# Patient Record
Sex: FEMALE | Race: OTHER | URBAN - METROPOLITAN AREA
[De-identification: names, ages, dates, MRNs, and addresses within clinical notes are randomized per-mention and may not be internally consistent; named-entity substitution may affect disease eponyms.]

---

## 2019-06-13 ENCOUNTER — INPATIENT (INPATIENT)
Facility: HOSPITAL | Age: 74
LOS: 3 days | Discharge: HOME | End: 2019-06-17
Attending: INTERNAL MEDICINE | Admitting: INTERNAL MEDICINE
Payer: MEDICARE

## 2019-06-13 VITALS
RESPIRATION RATE: 17 BRPM | DIASTOLIC BLOOD PRESSURE: 75 MMHG | SYSTOLIC BLOOD PRESSURE: 150 MMHG | HEART RATE: 89 BPM | OXYGEN SATURATION: 98 % | HEIGHT: 61 IN | TEMPERATURE: 98 F | WEIGHT: 160.06 LBS

## 2019-06-13 LAB
ALBUMIN SERPL ELPH-MCNC: 3.9 G/DL — SIGNIFICANT CHANGE UP (ref 3.3–5)
ALP SERPL-CCNC: 69 U/L — SIGNIFICANT CHANGE UP (ref 40–120)
ALT FLD-CCNC: 30 U/L — SIGNIFICANT CHANGE UP (ref 12–78)
AMMONIA BLD-MCNC: 16 UMOL/L — SIGNIFICANT CHANGE UP (ref 11–32)
ANION GAP SERPL CALC-SCNC: 8 MMOL/L — SIGNIFICANT CHANGE UP (ref 5–17)
APAP SERPL-MCNC: < 2 UG/ML (ref 10–30)
APTT BLD: 30 SEC — SIGNIFICANT CHANGE UP (ref 27.5–36.3)
AST SERPL-CCNC: 30 U/L — SIGNIFICANT CHANGE UP (ref 15–37)
BASOPHILS # BLD AUTO: 0.05 K/UL — SIGNIFICANT CHANGE UP (ref 0–0.2)
BASOPHILS NFR BLD AUTO: 0.6 % — SIGNIFICANT CHANGE UP (ref 0–2)
BILIRUB SERPL-MCNC: 0.4 MG/DL — SIGNIFICANT CHANGE UP (ref 0.2–1.2)
BUN SERPL-MCNC: 12 MG/DL — SIGNIFICANT CHANGE UP (ref 7–23)
CALCIUM SERPL-MCNC: 9 MG/DL — SIGNIFICANT CHANGE UP (ref 8.5–10.1)
CHLORIDE SERPL-SCNC: 105 MMOL/L — SIGNIFICANT CHANGE UP (ref 96–108)
CO2 SERPL-SCNC: 26 MMOL/L — SIGNIFICANT CHANGE UP (ref 22–31)
CREAT SERPL-MCNC: 1.02 MG/DL — SIGNIFICANT CHANGE UP (ref 0.5–1.3)
EOSINOPHIL # BLD AUTO: 0 K/UL — SIGNIFICANT CHANGE UP (ref 0–0.5)
EOSINOPHIL NFR BLD AUTO: 0 % — SIGNIFICANT CHANGE UP (ref 0–6)
ETHANOL SERPL-MCNC: <10 MG/DL — SIGNIFICANT CHANGE UP (ref 0–10)
GLUCOSE SERPL-MCNC: 166 MG/DL — HIGH (ref 70–99)
HCT VFR BLD CALC: 35.1 % — SIGNIFICANT CHANGE UP (ref 34.5–45)
HGB BLD-MCNC: 11.2 G/DL — LOW (ref 11.5–15.5)
IMM GRANULOCYTES NFR BLD AUTO: 0.4 % — SIGNIFICANT CHANGE UP (ref 0–1.5)
INR BLD: 1 RATIO — SIGNIFICANT CHANGE UP (ref 0.88–1.16)
LACTATE SERPL-SCNC: 1.5 MMOL/L — SIGNIFICANT CHANGE UP (ref 0.7–2)
LIDOCAIN IGE QN: 117 U/L — SIGNIFICANT CHANGE UP (ref 73–393)
LYMPHOCYTES # BLD AUTO: 0.97 K/UL — LOW (ref 1–3.3)
LYMPHOCYTES # BLD AUTO: 12.6 % — LOW (ref 13–44)
MCHC RBC-ENTMCNC: 30 PG — SIGNIFICANT CHANGE UP (ref 27–34)
MCHC RBC-ENTMCNC: 31.9 GM/DL — LOW (ref 32–36)
MCV RBC AUTO: 94.1 FL — SIGNIFICANT CHANGE UP (ref 80–100)
MONOCYTES # BLD AUTO: 0.31 K/UL — SIGNIFICANT CHANGE UP (ref 0–0.9)
MONOCYTES NFR BLD AUTO: 4 % — SIGNIFICANT CHANGE UP (ref 2–14)
NEUTROPHILS # BLD AUTO: 6.35 K/UL — SIGNIFICANT CHANGE UP (ref 1.8–7.4)
NEUTROPHILS NFR BLD AUTO: 82.4 % — HIGH (ref 43–77)
NRBC # BLD: 0 /100 WBCS — SIGNIFICANT CHANGE UP (ref 0–0)
PLATELET # BLD AUTO: 420 K/UL — HIGH (ref 150–400)
POTASSIUM SERPL-MCNC: 4 MMOL/L — SIGNIFICANT CHANGE UP (ref 3.5–5.3)
POTASSIUM SERPL-SCNC: 4 MMOL/L — SIGNIFICANT CHANGE UP (ref 3.5–5.3)
PROT SERPL-MCNC: 8 GM/DL — SIGNIFICANT CHANGE UP (ref 6–8.3)
PROTHROM AB SERPL-ACNC: 11.2 SEC — SIGNIFICANT CHANGE UP (ref 10–12.9)
RBC # BLD: 3.73 M/UL — LOW (ref 3.8–5.2)
RBC # FLD: 13.7 % — SIGNIFICANT CHANGE UP (ref 10.3–14.5)
SALICYLATES SERPL-MCNC: <1.7 MG/DL — LOW (ref 2.8–20)
SODIUM SERPL-SCNC: 139 MMOL/L — SIGNIFICANT CHANGE UP (ref 135–145)
TROPONIN I SERPL-MCNC: <.015 NG/ML — SIGNIFICANT CHANGE UP (ref 0.01–0.04)
WBC # BLD: 7.71 K/UL — SIGNIFICANT CHANGE UP (ref 3.8–10.5)
WBC # FLD AUTO: 7.71 K/UL — SIGNIFICANT CHANGE UP (ref 3.8–10.5)

## 2019-06-13 PROCEDURE — 71045 X-RAY EXAM CHEST 1 VIEW: CPT | Mod: 26

## 2019-06-13 PROCEDURE — 99285 EMERGENCY DEPT VISIT HI MDM: CPT

## 2019-06-13 RX ORDER — ONDANSETRON 8 MG/1
4 TABLET, FILM COATED ORAL ONCE
Refills: 0 | Status: COMPLETED | OUTPATIENT
Start: 2019-06-13 | End: 2019-06-13

## 2019-06-13 RX ORDER — SODIUM CHLORIDE 9 MG/ML
1000 INJECTION INTRAMUSCULAR; INTRAVENOUS; SUBCUTANEOUS ONCE
Refills: 0 | Status: COMPLETED | OUTPATIENT
Start: 2019-06-13 | End: 2019-06-13

## 2019-06-13 RX ADMIN — SODIUM CHLORIDE 1000 MILLILITER(S): 9 INJECTION INTRAMUSCULAR; INTRAVENOUS; SUBCUTANEOUS at 22:14

## 2019-06-13 RX ADMIN — ONDANSETRON 4 MILLIGRAM(S): 8 TABLET, FILM COATED ORAL at 22:13

## 2019-06-13 NOTE — ED ADULT NURSE NOTE - DOES PATIENT HAVE ADVANCE DIRECTIVE
Patient should be seen in clinic to discuss results of CT of the chest and bone scan. Please schedule him for follow up to discuss these results.    No

## 2019-06-13 NOTE — ED ADULT NURSE NOTE - NSIMPLEMENTINTERV_GEN_ALL_ED
Implemented All Fall with Harm Risk Interventions:  [place] to call system. Call bell, personal items and telephone within reach. Instruct patient to call for assistance. Room bathroom lighting operational. Non-slip footwear when patient is off stretcher. Physically safe environment: no spills, clutter or unnecessary equipment. Stretcher in lowest position, wheels locked, appropriate side rails in place. Provide visual cue, wrist band, yellow gown, etc. Monitor gait and stability. Monitor for mental status changes and reorient to person, place, and time. Review medications for side effects contributing to fall risk. Reinforce activity limits and safety measures with patient and family. Provide visual clues: red socks.

## 2019-06-13 NOTE — ED ADULT NURSE NOTE - OBJECTIVE STATEMENT
pt lethargic refuses to speak, pt [place]i speaking only. Md ibanez at bedside translation, pt refuses to speak and states " leave me alone", pt excessive sleepiness, drowsy, lethargic. PERRLA +. Pt refuses to follow commands Pt BIBA as per family pt not at baseline, confused and suspicion for overdose of medications. 1:1 initiated. PMH Hyperlipidemia, HTN, DM as per family. As per family leg pain at home took new pain medication (unknown name), began having N/V at home

## 2019-06-14 DIAGNOSIS — Z29.9 ENCOUNTER FOR PROPHYLACTIC MEASURES, UNSPECIFIED: ICD-10-CM

## 2019-06-14 DIAGNOSIS — T50.905A ADVERSE EFFECT OF UNSPECIFIED DRUGS, MEDICAMENTS AND BIOLOGICAL SUBSTANCES, INITIAL ENCOUNTER: ICD-10-CM

## 2019-06-14 DIAGNOSIS — R41.0 DISORIENTATION, UNSPECIFIED: ICD-10-CM

## 2019-06-14 DIAGNOSIS — I10 ESSENTIAL (PRIMARY) HYPERTENSION: ICD-10-CM

## 2019-06-14 DIAGNOSIS — F19.921 OTHER PSYCHOACTIVE SUBSTANCE USE, UNSPECIFIED WITH INTOXICATION WITH DELIRIUM: ICD-10-CM

## 2019-06-14 DIAGNOSIS — E11.8 TYPE 2 DIABETES MELLITUS WITH UNSPECIFIED COMPLICATIONS: ICD-10-CM

## 2019-06-14 LAB
ALBUMIN SERPL ELPH-MCNC: 3.5 G/DL — SIGNIFICANT CHANGE UP (ref 3.3–5)
ALP SERPL-CCNC: 64 U/L — SIGNIFICANT CHANGE UP (ref 40–120)
ALT FLD-CCNC: 26 U/L — SIGNIFICANT CHANGE UP (ref 12–78)
AMPHET UR-MCNC: NEGATIVE — SIGNIFICANT CHANGE UP
ANION GAP SERPL CALC-SCNC: 9 MMOL/L — SIGNIFICANT CHANGE UP (ref 5–17)
APPEARANCE UR: CLEAR — SIGNIFICANT CHANGE UP
AST SERPL-CCNC: 23 U/L — SIGNIFICANT CHANGE UP (ref 15–37)
BARBITURATES UR SCN-MCNC: NEGATIVE — SIGNIFICANT CHANGE UP
BASOPHILS # BLD AUTO: 0.04 K/UL — SIGNIFICANT CHANGE UP (ref 0–0.2)
BASOPHILS NFR BLD AUTO: 0.7 % — SIGNIFICANT CHANGE UP (ref 0–2)
BENZODIAZ UR-MCNC: NEGATIVE — SIGNIFICANT CHANGE UP
BILIRUB SERPL-MCNC: 0.4 MG/DL — SIGNIFICANT CHANGE UP (ref 0.2–1.2)
BILIRUB UR-MCNC: NEGATIVE — SIGNIFICANT CHANGE UP
BUN SERPL-MCNC: 10 MG/DL — SIGNIFICANT CHANGE UP (ref 7–23)
CALCIUM SERPL-MCNC: 8.7 MG/DL — SIGNIFICANT CHANGE UP (ref 8.5–10.1)
CHLORIDE SERPL-SCNC: 113 MMOL/L — HIGH (ref 96–108)
CO2 SERPL-SCNC: 25 MMOL/L — SIGNIFICANT CHANGE UP (ref 22–31)
COCAINE METAB.OTHER UR-MCNC: NEGATIVE — SIGNIFICANT CHANGE UP
COLOR SPEC: YELLOW — SIGNIFICANT CHANGE UP
CREAT SERPL-MCNC: 0.83 MG/DL — SIGNIFICANT CHANGE UP (ref 0.5–1.3)
DIFF PNL FLD: NEGATIVE — SIGNIFICANT CHANGE UP
EOSINOPHIL # BLD AUTO: 0.05 K/UL — SIGNIFICANT CHANGE UP (ref 0–0.5)
EOSINOPHIL NFR BLD AUTO: 0.9 % — SIGNIFICANT CHANGE UP (ref 0–6)
EPI CELLS # UR: SIGNIFICANT CHANGE UP
GLUCOSE SERPL-MCNC: 94 MG/DL — SIGNIFICANT CHANGE UP (ref 70–99)
GLUCOSE UR QL: NEGATIVE MG/DL — SIGNIFICANT CHANGE UP
HCT VFR BLD CALC: 33.2 % — LOW (ref 34.5–45)
HGB BLD-MCNC: 10.6 G/DL — LOW (ref 11.5–15.5)
IMM GRANULOCYTES NFR BLD AUTO: 0.2 % — SIGNIFICANT CHANGE UP (ref 0–1.5)
KETONES UR-MCNC: NEGATIVE — SIGNIFICANT CHANGE UP
LEUKOCYTE ESTERASE UR-ACNC: ABNORMAL
LYMPHOCYTES # BLD AUTO: 1.69 K/UL — SIGNIFICANT CHANGE UP (ref 1–3.3)
LYMPHOCYTES # BLD AUTO: 28.8 % — SIGNIFICANT CHANGE UP (ref 13–44)
MCHC RBC-ENTMCNC: 29.8 PG — SIGNIFICANT CHANGE UP (ref 27–34)
MCHC RBC-ENTMCNC: 31.9 GM/DL — LOW (ref 32–36)
MCV RBC AUTO: 93.3 FL — SIGNIFICANT CHANGE UP (ref 80–100)
METHADONE UR-MCNC: NEGATIVE — SIGNIFICANT CHANGE UP
MONOCYTES # BLD AUTO: 0.42 K/UL — SIGNIFICANT CHANGE UP (ref 0–0.9)
MONOCYTES NFR BLD AUTO: 7.2 % — SIGNIFICANT CHANGE UP (ref 2–14)
NEUTROPHILS # BLD AUTO: 3.66 K/UL — SIGNIFICANT CHANGE UP (ref 1.8–7.4)
NEUTROPHILS NFR BLD AUTO: 62.2 % — SIGNIFICANT CHANGE UP (ref 43–77)
NITRITE UR-MCNC: NEGATIVE — SIGNIFICANT CHANGE UP
NRBC # BLD: 0 /100 WBCS — SIGNIFICANT CHANGE UP (ref 0–0)
OPIATES UR-MCNC: NEGATIVE — SIGNIFICANT CHANGE UP
PCP SPEC-MCNC: SIGNIFICANT CHANGE UP
PCP UR-MCNC: NEGATIVE — SIGNIFICANT CHANGE UP
PH UR: 7 — SIGNIFICANT CHANGE UP (ref 5–8)
PLATELET # BLD AUTO: 384 K/UL — SIGNIFICANT CHANGE UP (ref 150–400)
POTASSIUM SERPL-MCNC: 3.5 MMOL/L — SIGNIFICANT CHANGE UP (ref 3.5–5.3)
POTASSIUM SERPL-SCNC: 3.5 MMOL/L — SIGNIFICANT CHANGE UP (ref 3.5–5.3)
PROT SERPL-MCNC: 7.3 GM/DL — SIGNIFICANT CHANGE UP (ref 6–8.3)
PROT UR-MCNC: NEGATIVE MG/DL — SIGNIFICANT CHANGE UP
RBC # BLD: 3.56 M/UL — LOW (ref 3.8–5.2)
RBC # FLD: 13.6 % — SIGNIFICANT CHANGE UP (ref 10.3–14.5)
SODIUM SERPL-SCNC: 147 MMOL/L — HIGH (ref 135–145)
SP GR SPEC: 1 — LOW (ref 1.01–1.02)
THC UR QL: NEGATIVE — SIGNIFICANT CHANGE UP
UROBILINOGEN FLD QL: NEGATIVE MG/DL — SIGNIFICANT CHANGE UP
WBC # BLD: 5.87 K/UL — SIGNIFICANT CHANGE UP (ref 3.8–10.5)
WBC # FLD AUTO: 5.87 K/UL — SIGNIFICANT CHANGE UP (ref 3.8–10.5)

## 2019-06-14 PROCEDURE — 93010 ELECTROCARDIOGRAM REPORT: CPT

## 2019-06-14 PROCEDURE — 74177 CT ABD & PELVIS W/CONTRAST: CPT | Mod: 26

## 2019-06-14 PROCEDURE — 90792 PSYCH DIAG EVAL W/MED SRVCS: CPT

## 2019-06-14 PROCEDURE — 99223 1ST HOSP IP/OBS HIGH 75: CPT

## 2019-06-14 PROCEDURE — 70450 CT HEAD/BRAIN W/O DYE: CPT | Mod: 26

## 2019-06-14 RX ORDER — FOLIC ACID 0.8 MG
1 TABLET ORAL DAILY
Refills: 0 | Status: DISCONTINUED | OUTPATIENT
Start: 2019-06-14 | End: 2019-06-17

## 2019-06-14 RX ORDER — ATORVASTATIN CALCIUM 80 MG/1
40 TABLET, FILM COATED ORAL AT BEDTIME
Refills: 0 | Status: DISCONTINUED | OUTPATIENT
Start: 2019-06-14 | End: 2019-06-17

## 2019-06-14 RX ORDER — HALOPERIDOL DECANOATE 100 MG/ML
2 INJECTION INTRAMUSCULAR EVERY 6 HOURS
Refills: 0 | Status: DISCONTINUED | OUTPATIENT
Start: 2019-06-14 | End: 2019-06-14

## 2019-06-14 RX ORDER — MIDAZOLAM HYDROCHLORIDE 1 MG/ML
2 INJECTION, SOLUTION INTRAMUSCULAR; INTRAVENOUS ONCE
Refills: 0 | Status: DISCONTINUED | OUTPATIENT
Start: 2019-06-14 | End: 2019-06-14

## 2019-06-14 RX ORDER — ONDANSETRON 8 MG/1
4 TABLET, FILM COATED ORAL EVERY 6 HOURS
Refills: 0 | Status: DISCONTINUED | OUTPATIENT
Start: 2019-06-14 | End: 2019-06-17

## 2019-06-14 RX ORDER — HALOPERIDOL DECANOATE 100 MG/ML
3 INJECTION INTRAMUSCULAR EVERY 6 HOURS
Refills: 0 | Status: DISCONTINUED | OUTPATIENT
Start: 2019-06-14 | End: 2019-06-17

## 2019-06-14 RX ORDER — HALOPERIDOL DECANOATE 100 MG/ML
2 INJECTION INTRAMUSCULAR EVERY 6 HOURS
Refills: 0 | Status: DISCONTINUED | OUTPATIENT
Start: 2019-06-14 | End: 2019-06-17

## 2019-06-14 RX ORDER — NALOXONE HYDROCHLORIDE 4 MG/.1ML
0.4 SPRAY NASAL ONCE
Refills: 0 | Status: COMPLETED | OUTPATIENT
Start: 2019-06-14 | End: 2019-06-14

## 2019-06-14 RX ORDER — MIRTAZAPINE 45 MG/1
15 TABLET, ORALLY DISINTEGRATING ORAL AT BEDTIME
Refills: 0 | Status: DISCONTINUED | OUTPATIENT
Start: 2019-06-14 | End: 2019-06-17

## 2019-06-14 RX ORDER — BACLOFEN 100 %
20 POWDER (GRAM) MISCELLANEOUS
Qty: 0 | Refills: 0 | DISCHARGE

## 2019-06-14 RX ORDER — HEPARIN SODIUM 5000 [USP'U]/ML
5000 INJECTION INTRAVENOUS; SUBCUTANEOUS EVERY 8 HOURS
Refills: 0 | Status: DISCONTINUED | OUTPATIENT
Start: 2019-06-14 | End: 2019-06-17

## 2019-06-14 RX ADMIN — ONDANSETRON 4 MILLIGRAM(S): 8 TABLET, FILM COATED ORAL at 22:33

## 2019-06-14 RX ADMIN — MIDAZOLAM HYDROCHLORIDE 2 MILLIGRAM(S): 1 INJECTION, SOLUTION INTRAMUSCULAR; INTRAVENOUS at 01:05

## 2019-06-14 RX ADMIN — NALOXONE HYDROCHLORIDE 0.4 MILLIGRAM(S): 4 SPRAY NASAL at 02:55

## 2019-06-14 RX ADMIN — ONDANSETRON 4 MILLIGRAM(S): 8 TABLET, FILM COATED ORAL at 14:12

## 2019-06-14 RX ADMIN — HEPARIN SODIUM 5000 UNIT(S): 5000 INJECTION INTRAVENOUS; SUBCUTANEOUS at 22:34

## 2019-06-14 RX ADMIN — MIDAZOLAM HYDROCHLORIDE 2 MILLIGRAM(S): 1 INJECTION, SOLUTION INTRAMUSCULAR; INTRAVENOUS at 00:31

## 2019-06-14 RX ADMIN — Medication 1 MILLIGRAM(S): at 13:37

## 2019-06-14 RX ADMIN — HEPARIN SODIUM 5000 UNIT(S): 5000 INJECTION INTRAVENOUS; SUBCUTANEOUS at 13:37

## 2019-06-14 RX ADMIN — ATORVASTATIN CALCIUM 40 MILLIGRAM(S): 80 TABLET, FILM COATED ORAL at 22:34

## 2019-06-14 NOTE — ED PROVIDER NOTE - CHIEF COMPLAINT
Patient to be transferred to [facility].  Is being transferred due to 
Insurance request.  Receiving facility has accepting physician and available 
space. ER physician has signed transfer form.  Patient or responsible party has 
agreed to transfer and signed form.  Patient belongings inventoried and will be 
sent with wife.  Copy of nursing notes, lab reports, EKG, Physicians Orders and 
X-rays to be sent with patient.  Report called to LLOYD Reyes at receiving 
facility.  [facility] ambulance service has been called for transfer.  ETA is 
now. The patient is a 74y Female complaining of altered mental status.

## 2019-06-14 NOTE — CHART NOTE - NSCHARTNOTEFT_GEN_A_CORE
H&P dated today reviewed in full. H&P dated today reviewed in full.  CT head and abdomen all normal. Lactate normal 1.5. Urine tox negative. Has history of Bipolar disorder. Stopped taking meds past few months.  Will ask psychiatry to reeval starting antipsychotic meds.     [employer] cell phone number is [phone].

## 2019-06-14 NOTE — H&P ADULT - HISTORY OF PRESENT ILLNESS
Pt admitted for possible overdose, in progress. 74 year old woman with PMH HTN, HLD, DM2, bipolar presents to ED after being unresponsive at home.  The majority of history was obtained from ED attending and chart as family not available.  Per ED attending, patient is from  and has been living here for months.  Patient was not feeling well 2 days ago and was given a medication by niece (the medication is not known).  After taking the medication the patient became very confused and lethargic and could not be aroused.  With supportive care at home, patient was back at baseline.  It is unclear how patient then got more of the medication (family thinks she took it on her own) but she was acting exactly the same last night so she was brought to our ED.  No one witnessed her taking more of the medication.  Patient is confused and not providing much history when seen.    In the ED, work up essentially normal.  Per ED attending, pt's pupils were pinpoint so Narcan was given and patient suddenly became much more alert and responsive.  Per poison control, given history the patient possibly ingested Suboxone or Ultram.  Family not available at this time.  Niece (not the niece who gave medication in question: Liliya [phone].

## 2019-06-14 NOTE — H&P ADULT - NSHPLABSRESULTS_GEN_ALL_CORE
Vital Signs Last 24 Hrs  T(C): 36.3 (2019 07:04), Max: 37.1 (2019 23:05)  T(F): 97.4 (2019 07:04), Max: 98.7 (2019 23:05)  HR: 85 (2019 07:04) (65 - 89)  BP: 153/77 (2019 07:04) (122/60 - 153/77)  BP(mean): --  RR: 19 (2019 07:04) (12 - 19)  SpO2: 99% (2019 07:04) (94% - 100%)          LABS:                        11.2   7.71  )-----------( 420      ( 2019 21:57 )             35.1     06-13    139  |  105  |  12  ----------------------------<  166<H>  4.0   |  26  |  1.02    Ca    9.0      2019 21:57    TPro  8.0  /  Alb  3.9  /  TBili  0.4  /  DBili  x   /  AST  30  /  ALT  30  /  AlkPhos  69  06-13    PT/INR - ( 2019 21:57 )   PT: 11.2 sec;   INR: 1.00 ratio         PTT - ( 2019 21:57 )  PTT:30.0 sec  Urinalysis Basic - ( 2019 01:40 )    Color: Yellow / Appearance: Clear / S.005 / pH: x  Gluc: x / Ketone: Negative  / Bili: Negative / Urobili: Negative mg/dL   Blood: x / Protein: Negative mg/dL / Nitrite: Negative   Leuk Esterase: Trace / RBC: x / WBC x   Sq Epi: x / Non Sq Epi: Few / Bacteria: x        RADIOLOGY & ADDITIONAL STUDIES:    CT head:  IMPRESSION:  No acute intracranial hemorrhage or mass effect from vasogenic edema.

## 2019-06-14 NOTE — H&P ADULT - ASSESSMENT
74 year old woman with PMH HTN, HLD, DM2, bipolar presents to ED after being unresponsive at home.  Patient will require admission for at least 2 midnights as detailed below:    IMPROVE VTE Individual Risk Assessment          RISK                                                          Points    [  ] Previous VTE                                                3    [  ] Thrombophilia                                             2    [  ] Lower limb paralysis                                    2        (unable to hold up >15 seconds)      [  ] Current Cancer                                             2         (within 6 months)    [x  ] Immobilization > 24 hrs                              1    [  ] ICU/CCU stay > 24 hours                            1    [ x ] Age > 60                                                    1    IMPROVE VTE Score _2________

## 2019-06-14 NOTE — BEHAVIORAL HEALTH ASSESSMENT NOTE - NSBHCHARTREVIEWIMAGING_PSY_A_CORE FT
CT head Slight asymmetric left frontal scalp swelling; please correlate clinically for history of recent trauma.

## 2019-06-14 NOTE — PATIENT PROFILE ADULT - PRIMARY SOURCE OF SUPPORT/COMFORT
My note from February 11 clearly documents that it has been cosigned    I will order the hepatitis screen now child(crystal)

## 2019-06-14 NOTE — BEHAVIORAL HEALTH ASSESSMENT NOTE - HPI (INCLUDE ILLNESS QUALITY, SEVERITY, DURATION, TIMING, CONTEXT, MODIFYING FACTORS, ASSOCIATED SIGNS AND SYMPTOMS)
75 yo female, , lives in [place], [place] with family (, adult son and his wife), here in [place] for vacation and staying with family, with hx of prior inpatient psychiatric admission x 6 months, with no hx of suicide attempts/aggression/violence/substance use/legal issues.     baclofen, mirzepri for urine, Flexeril.     ISTOP Reference #: [id number]; no record of Patient    COLLATERAL FROM NIECE Liliya [phone]: Patient lives in [place], [place], lives with her , son and daughter-in-law. Patient has been here in [place] for a few months on vacation and arrived March and has been staying with family. Patient was on psychiatric medications in [place], multiple agents for depression / mood and other diagnosis niece can't recall. Patient was very sedated, even stumbled when walking, and slept a lot when she came and seemed overmedicated. Patient stopped her medications and did well - active with family, socializing, and acting normal. Patient was c/o general chronic pain (back? c/o difficulty walking). Another cousin gave Patient her own pain medications. Patient reported feeling dizzy, shaking and having diarrhea. Liliya brought Patient to her house and Patient was sitting on the toilet for a long time, did not seem to understand what people were saying for her, taking off her clothing.    Patient's medication bottles: buspirone 7mg PO qd; sertraline 150mg po qd (1.5 tabs of 100mg); aripriprazole 5mg po qd; Remeron 30mg po qhs. These medication were NOT in Pt's possession and Niece was holding them.  - Liliya does not think 73 yo  female, , lives in [place], [place] with family (, adult son and his wife), described as a "calm person," here in [place] for vacation and staying with family (has no return ticket yet), years of insomnia (hx of sleep aids), who has no formal psychiatric history prior to 6 months ago when she was admitted to inpatient psychiatric admission in [place], given a range of diagnosis such as Bipolar Disorder and depression, with no hx of suicide attempts/aggression/violence/substance use/legal issues, who was admitted with AMS.     ISTOP Reference #: [id number]; no record of Patient. CVM - no record of Patient     COLLATERAL FROM TOÑA Lovell [phone]: Patient lives in [place], [place], lives with her , son and daughter-in-law. Patient has been here in [place] for a few months on vacation and arrived March and has been staying with family. Patient was on psychiatric medications in [place], multiple agents for depression / mood and other diagnosis niece can't recall. Patient was very sedated, even stumbled when walking, and slept a lot when she came and seemed overmedicated. Patient stopped her medications and did well - active with family, socializing, and acting normal. Patient was c/o general chronic pain (back? c/o difficulty walking). Another cousin gave Patient her own pain medications. Patient reported feeling dizzy, shaking and having diarrhea. Liliya brought Patient to her house and Patient was sitting on the toilet for a long time, did not seem to understand what people were saying for her, taking off her clothing.    Patient's medication bottles: buspirone 7mg PO qd; sertraline 150mg po qd (1.5 tabs of 100mg); aripriprazole 5mg po qd; Remeron 30mg po qhs. These medication were NOT in Pt's possession and Niece was holding them. Patient also takes baclofen, Myrbetriq (mirabegron) for bladder spasms and Flexeril.   - Liliya does not think that this was a suicide attempt/ intentional overdose. 75 yo  female, , lives in [place], [place] with family (, adult son and his wife), described as a "calm person," here in [place] for [place] and staying with family (has no return ticket yet), years of insomnia (hx of sleep aids), who has no formal psychiatric history prior to 6 months ago when she was admitted to inpatient psychiatric admission in [place], given a range of diagnosis such as Bipolar Disorder and depression, with no hx of suicide attempts/aggression/violence/substance use/legal issues, who was admitted with AMS.     Patient is overall a semi-reliable historian at this time - Patient is calm, cooperative and friendly. Reports that she speaks basic English but is having a hard time recalling English words during conversation so  services was offered, accepted ( # [id number] for English used). Patient reports that she is here visiting her family, will return to [place] in 2 weeks, has adult kids and 5 grandkids, she is retired and likes to go to movies and read. Patient reports that she took "one pill" of a medication her other niece gave her for leg pain and she took the rest of her medications as well. Patient's recall is fuzzy and she cannot name her medications or even how many tabs she takes in am/pm etc. Patient admits that she has been taking medications given to her by others but cannot recall the names other than "they were for pain." Denies that this admission involved an intentional overdose or any suicidality. Patient was asked about her psychiatric admission / seeing a psychiatrist and she was not sure. At this point,  reported to Writer that Patient is getting confused and mixing herself up with the niece. Patient denies ever having any suicidal thoughts/attempts and reports her family and deep belief in God as protective factors. She reports feeling safe on the Unit at this time and denies any perceptual distortions. Patient is not oriented to time. + difficulty maintaining attention; at times holding her head and rubbing her temples and saying "just a minute. trying to think of that word." patient is not manifesting any psychosis or manix sxs at this time. + delirium / residual confusion     ISTOP Reference #: [id number]; no record of Patient. CVM - no record of Patient     COLLATERAL FROM NIECE Liliya [phone]: Patient lives in [place], [place], lives with her , son and daughter-in-law. Patient has been here in [place] for a few months on vacation and arrived March and has been staying with family. Patient was on psychiatric medications in [place], multiple agents for depression / mood and other diagnosis niece can't recall. Patient was very sedated, even stumbled when walking, and slept a lot when she came and seemed overmedicated. Patient stopped her medications and did well - active with family, socializing, and acting normal. Patient was c/o general chronic pain (back? c/o difficulty walking). Another cousin gave Patient her own pain medications. Patient reported feeling dizzy, shaking and having diarrhea. Liliya brought Patient to her house and Patient was sitting on the toilet for a long time, did not seem to understand what people were saying for her, taking off her clothing.    Patient's medication bottles: buspirone 7mg PO qd; sertraline 150mg po qd (1.5 tabs of 100mg); aripriprazole 5mg po qd; Remeron 30mg po qhs. These medication were NOT in Pt's possession and Niece was holding them. Patient also takes baclofen, Myrbetriq (mirabegron) for bladder spasms and Flexeril.   - Liliya does not think that this was a suicide attempt/ intentional overdose.

## 2019-06-14 NOTE — BEHAVIORAL HEALTH ASSESSMENT NOTE - OTHER
low end of fair adequate deferred at this time occasional latency as "trying to think of that word" "ok" looks confused at times but euthymic overall hx of insomnia x years but has been treated with medications trying other people's medications reactive linear with episodes of confusion and disorientation does not seem to be responding to internal stimuli MMSE once confusion clears limited at this time due to confusion

## 2019-06-14 NOTE — ED PROVIDER NOTE - OBJECTIVE STATEMENT
Pertinent PMH/PSH/FHx/SHx and Review of Systems contained within:  73 yo f with pmh of BPD not currently on meds, htn, hld and dm BIBEMS for ams tonight. As per niece, patient c/o pain 2 days ago for which another family member gave her a medication that made her act agitated and altered as well as making her vomit. After sleeping overnight patient returned to baseline state of health until tonight when she suddenly had same symptoms of vomiting and acting agitated and altered. Niece believes patient may have taken same medicine again but does not know what it is.

## 2019-06-14 NOTE — BEHAVIORAL HEALTH ASSESSMENT NOTE - SUMMARY
- Patient seemingly was overmedicated w/ heavy sedation on arrival from [place]; did better after her psychiatric medications were stopped  - her 4-agent [place] regimen is seemingly excessive for someone in her age group with no significant past psych history apart from a recent admission. Would benefit from simplification overall.   - suspecting Patient accidentally overdosed on muscle relaxers/pain medication which added with an anticholinergic medication like Myrbetriq, can result in confusion, disorganized behavior and AMS.   - let Pt's system clear out so hold any unnecessary medications at this time. Hence not ordering any standing psychiatric medications at this time - Patient seemingly was overmedicated w/ heavy sedation on arrival from [place]; did better after her psychiatric medications were stopped  - her 4-agent [place] regimen is seemingly excessive for someone in her age group with no significant past psych history apart from a recent admission. Would benefit from simplification overall.   - suspecting Patient accidentally overdosed on muscle relaxers/pain medication which added with an anticholinergic medication like Myrbetriq, can result in confusion, disorganized behavior and AMS. (family confirms that Patient has been using pain medications obtained from relatives and did not tolerate it well).   - let Pt's system clear out so hold any unnecessary medications at this time. Hence not ordering any standing psychiatric medications at this time Presentation consistent with delirium:   - continue CO 1:1 as Patient is still confused  - Patient cannot leave AMA at this time   - suspecting Patient accidentally overdosed on RX medication combo (ie. muscle relaxers/pain medication which added with an anticholinergic medication like Myrbetriq, can result in confusion, disorganized behavior and AMS = (family confirms that Patient has been using pain medications obtained from relatives and did not tolerate it well).   - let Pt's system clear out so hold any unnecessary medications at this time. Hence not ordering any standing psychiatric medications at this time  - Patient seemingly was overmedicated w/ heavy sedation on arrival from [place]; did better after her psychiatric medications were stopped  - her 4-agent [place] regimen is seemingly excessive for someone in her age group with no significant past psych history apart from a recent admission. Would benefit from simplification overall.

## 2019-06-14 NOTE — H&P ADULT - NSHPPHYSICALEXAM_GEN_ALL_CORE
GENERAL: NAD, well-groomed, well-developed  HEAD:  Atraumatic, Normocephalic  EYES: PERRLA, conjunctiva and sclera clear  ENMT: No tonsillar erythema, exudates, or enlargement; Moist mucous membranes, No lesions  NECK: Supple, No JVD, Normal thyroid  NERVOUS SYSTEM:  Alert & Oriented X1, poor concentration, cannot participate in full exam  CHEST/LUNG: Clear to ascultation bilaterally; No rales, rhonchi, wheezing, or rubs  HEART: Regular rate and rhythm; No murmurs, rubs, or gallops  ABDOMEN: Soft, Nontender, Nondistended; Bowel sounds present  EXTREMITIES: no clubbing, cyanosis, or edema  SKIN: no rashes or lesions  PSYCH: flat affect

## 2019-06-14 NOTE — BEHAVIORAL HEALTH ASSESSMENT NOTE - NSBHMEDSOTHERFT_PSY_A_CORE
buspirone 7mg PO qd; sertraline 150mg po qd (1.5 tabs of 100mg); aripriprazole 5mg po qd; Remeron 30mg po qhs.

## 2019-06-15 LAB
CULTURE RESULTS: SIGNIFICANT CHANGE UP
HCV AB S/CO SERPL IA: 0.13 S/CO — SIGNIFICANT CHANGE UP (ref 0–0.99)
HCV AB SERPL-IMP: SIGNIFICANT CHANGE UP
SPECIMEN SOURCE: SIGNIFICANT CHANGE UP

## 2019-06-15 PROCEDURE — 99233 SBSQ HOSP IP/OBS HIGH 50: CPT

## 2019-06-15 RX ORDER — GLUCAGON INJECTION, SOLUTION 0.5 MG/.1ML
1 INJECTION, SOLUTION SUBCUTANEOUS ONCE
Refills: 0 | Status: DISCONTINUED | OUTPATIENT
Start: 2019-06-15 | End: 2019-06-17

## 2019-06-15 RX ORDER — ACETAMINOPHEN 500 MG
650 TABLET ORAL EVERY 6 HOURS
Refills: 0 | Status: DISCONTINUED | OUTPATIENT
Start: 2019-06-15 | End: 2019-06-17

## 2019-06-15 RX ORDER — AMLODIPINE BESYLATE 2.5 MG/1
5 TABLET ORAL DAILY
Refills: 0 | Status: DISCONTINUED | OUTPATIENT
Start: 2019-06-15 | End: 2019-06-17

## 2019-06-15 RX ORDER — INSULIN LISPRO 100/ML
VIAL (ML) SUBCUTANEOUS
Refills: 0 | Status: DISCONTINUED | OUTPATIENT
Start: 2019-06-15 | End: 2019-06-17

## 2019-06-15 RX ORDER — DEXTROSE 50 % IN WATER 50 %
12.5 SYRINGE (ML) INTRAVENOUS ONCE
Refills: 0 | Status: DISCONTINUED | OUTPATIENT
Start: 2019-06-15 | End: 2019-06-17

## 2019-06-15 RX ORDER — DEXTROSE 50 % IN WATER 50 %
25 SYRINGE (ML) INTRAVENOUS ONCE
Refills: 0 | Status: DISCONTINUED | OUTPATIENT
Start: 2019-06-15 | End: 2019-06-17

## 2019-06-15 RX ORDER — METOPROLOL TARTRATE 50 MG
25 TABLET ORAL
Refills: 0 | Status: DISCONTINUED | OUTPATIENT
Start: 2019-06-15 | End: 2019-06-15

## 2019-06-15 RX ORDER — SODIUM CHLORIDE 9 MG/ML
1000 INJECTION, SOLUTION INTRAVENOUS
Refills: 0 | Status: DISCONTINUED | OUTPATIENT
Start: 2019-06-15 | End: 2019-06-17

## 2019-06-15 RX ORDER — DEXTROSE 50 % IN WATER 50 %
15 SYRINGE (ML) INTRAVENOUS ONCE
Refills: 0 | Status: DISCONTINUED | OUTPATIENT
Start: 2019-06-15 | End: 2019-06-17

## 2019-06-15 RX ORDER — METOPROLOL TARTRATE 50 MG
25 TABLET ORAL
Refills: 0 | Status: DISCONTINUED | OUTPATIENT
Start: 2019-06-15 | End: 2019-06-17

## 2019-06-15 RX ADMIN — MIRTAZAPINE 15 MILLIGRAM(S): 45 TABLET, ORALLY DISINTEGRATING ORAL at 22:35

## 2019-06-15 RX ADMIN — HEPARIN SODIUM 5000 UNIT(S): 5000 INJECTION INTRAVENOUS; SUBCUTANEOUS at 14:32

## 2019-06-15 RX ADMIN — Medication 650 MILLIGRAM(S): at 20:33

## 2019-06-15 RX ADMIN — AMLODIPINE BESYLATE 5 MILLIGRAM(S): 2.5 TABLET ORAL at 06:22

## 2019-06-15 RX ADMIN — HEPARIN SODIUM 5000 UNIT(S): 5000 INJECTION INTRAVENOUS; SUBCUTANEOUS at 05:55

## 2019-06-15 RX ADMIN — Medication 650 MILLIGRAM(S): at 06:22

## 2019-06-15 RX ADMIN — Medication 1 MILLIGRAM(S): at 11:42

## 2019-06-15 RX ADMIN — ATORVASTATIN CALCIUM 40 MILLIGRAM(S): 80 TABLET, FILM COATED ORAL at 22:14

## 2019-06-15 RX ADMIN — Medication 650 MILLIGRAM(S): at 19:33

## 2019-06-15 RX ADMIN — Medication 25 MILLIGRAM(S): at 18:00

## 2019-06-15 NOTE — PROGRESS NOTE BEHAVIORAL HEALTH - OTHER
does not seem to be responding to internal stimuli MMSE once confusion clears limited at this time due to confusion low end of fair adequate deferred at this time occasional latency as "trying to think of that word" "ok" looks confused at times but euthymic overall reactive linear with episodes of confusion and disorientation occasional latency as "trying to think of that word" not noted today looks less confused the  yesterday; euthymic overall linear with episodes of confusion and disorientation but has shown improvement since yesterday improved since yesterday low end of fair at this time due to confusion

## 2019-06-15 NOTE — PROGRESS NOTE BEHAVIORAL HEALTH - SUMMARY
Presentation consistent with delirium:   -  PT cleared Patient, she has been calm, cooperative - try enhanced supervision  - Patient still cannot leave AMA at this time   - suspecting Patient accidentally overdosed on RX medication combo (ie. muscle relaxers/pain medication which added with an anticholinergic medication like Myrbetriq, can result in confusion, disorganized behavior and AMS = (family confirms that Patient has been using pain medications obtained from relatives and did not tolerate it well).   - let Pt's system clear out so hold any unnecessary medications at this time. Hence not ordering any standing psychiatric medications at this time  - Patient seemingly was overmedicated w/ heavy sedation on arrival from [place]; did better after her psychiatric medications were stopped  - her 4-agent [place] regimen is seemingly excessive for someone in her age group with no significant past psych history apart from a recent admission. Would benefit from simplification overall.

## 2019-06-15 NOTE — PHYSICAL THERAPY INITIAL EVALUATION ADULT - ADDITIONAL COMMENTS
Pt resides in [place] and has 4 steps in home environment c R railing up/L railing down. Pt uses a rollator for ambulation and was independent in ADL's and mobility.

## 2019-06-16 LAB
ANION GAP SERPL CALC-SCNC: 4 MMOL/L — LOW (ref 5–17)
BUN SERPL-MCNC: 13 MG/DL — SIGNIFICANT CHANGE UP (ref 7–23)
CALCIUM SERPL-MCNC: 8.6 MG/DL — SIGNIFICANT CHANGE UP (ref 8.5–10.1)
CHLORIDE SERPL-SCNC: 108 MMOL/L — SIGNIFICANT CHANGE UP (ref 96–108)
CO2 SERPL-SCNC: 30 MMOL/L — SIGNIFICANT CHANGE UP (ref 22–31)
CREAT SERPL-MCNC: 0.95 MG/DL — SIGNIFICANT CHANGE UP (ref 0.5–1.3)
GLUCOSE SERPL-MCNC: 85 MG/DL — SIGNIFICANT CHANGE UP (ref 70–99)
HBA1C BLD-MCNC: 6 % — HIGH (ref 4–5.6)
HCT VFR BLD CALC: 33.6 % — LOW (ref 34.5–45)
HGB BLD-MCNC: 10.7 G/DL — LOW (ref 11.5–15.5)
MCHC RBC-ENTMCNC: 30.1 PG — SIGNIFICANT CHANGE UP (ref 27–34)
MCHC RBC-ENTMCNC: 31.8 GM/DL — LOW (ref 32–36)
MCV RBC AUTO: 94.4 FL — SIGNIFICANT CHANGE UP (ref 80–100)
NRBC # BLD: 0 /100 WBCS — SIGNIFICANT CHANGE UP (ref 0–0)
PLATELET # BLD AUTO: 411 K/UL — HIGH (ref 150–400)
POTASSIUM SERPL-MCNC: 3.5 MMOL/L — SIGNIFICANT CHANGE UP (ref 3.5–5.3)
POTASSIUM SERPL-SCNC: 3.5 MMOL/L — SIGNIFICANT CHANGE UP (ref 3.5–5.3)
RBC # BLD: 3.56 M/UL — LOW (ref 3.8–5.2)
RBC # FLD: 13.6 % — SIGNIFICANT CHANGE UP (ref 10.3–14.5)
SODIUM SERPL-SCNC: 142 MMOL/L — SIGNIFICANT CHANGE UP (ref 135–145)
WBC # BLD: 4.84 K/UL — SIGNIFICANT CHANGE UP (ref 3.8–10.5)
WBC # FLD AUTO: 4.84 K/UL — SIGNIFICANT CHANGE UP (ref 3.8–10.5)

## 2019-06-16 PROCEDURE — 99232 SBSQ HOSP IP/OBS MODERATE 35: CPT

## 2019-06-16 RX ORDER — POTASSIUM CHLORIDE 20 MEQ
40 PACKET (EA) ORAL ONCE
Refills: 0 | Status: COMPLETED | OUTPATIENT
Start: 2019-06-16 | End: 2019-06-16

## 2019-06-16 RX ADMIN — Medication 40 MILLIEQUIVALENT(S): at 10:10

## 2019-06-16 RX ADMIN — AMLODIPINE BESYLATE 5 MILLIGRAM(S): 2.5 TABLET ORAL at 06:17

## 2019-06-16 RX ADMIN — Medication 25 MILLIGRAM(S): at 17:48

## 2019-06-16 RX ADMIN — Medication 25 MILLIGRAM(S): at 06:17

## 2019-06-16 RX ADMIN — Medication 1 MILLIGRAM(S): at 11:22

## 2019-06-16 RX ADMIN — HEPARIN SODIUM 5000 UNIT(S): 5000 INJECTION INTRAVENOUS; SUBCUTANEOUS at 14:11

## 2019-06-16 NOTE — PROGRESS NOTE BEHAVIORAL HEALTH - SUMMARY
75 yo  female, , lives in [place], [place] with family (, adult son and his wife), described as a "calm person," here in [place] for vacation and staying with family (has no return ticket yet), years of insomnia (hx of sleep aids), who has no formal psychiatric history prior to 6 months ago when she was admitted to inpatient psychiatric admission in [place], given a range of diagnosis such as Bipolar Disorder and depression, with no hx of suicide attempts/aggression/violence/substance use/legal issues, who was admitted with AMS 2/2 medication interaction    Presentation consistent with delirium:   - PT cleared Patient, she has been calm, cooperative - try enhanced supervision. Has not been tried. Will order it now  - Patient still cannot leave AMA at this time   - suspecting Patient accidentally overdosed on RX medication combo (ie. muscle relaxers/pain medication which added with an anticholinergic medication like Myrbetriq, can result in confusion, disorganized behavior and AMS = (family confirms that Patient has been using pain medications obtained from relatives and did not tolerate it well).   - let Pt's system clear out so hold any unnecessary medications at this time. Hence not ordering any standing psychiatric medications at this time  - Patient seemingly was overmedicated w/ heavy sedation on arrival from [place]; did better after her psychiatric medications were stopped  - her 4-agent [place] regimen is seemingly excessive for someone in her age group with no significant past psych history apart from a recent admission. Has benefited from simplification overall. 73 yo  female, , lives in [place], [place] with family (, adult son and his wife), described as a "calm person," here in [place] for vacation and staying with family (has no return ticket yet), years of insomnia (hx of sleep aids), who has no formal psychiatric history prior to 6 months ago when she was admitted to inpatient psychiatric admission in [place], given a range of diagnosis such as Bipolar Disorder and depression, with no hx of suicide attempts/aggression/violence/substance use/legal issues, who was admitted with AMS 2/2 medication interaction    Presentation consistent with delirium:  - suspecting Patient accidentally overdosed on RX medication combo (ie. muscle relaxers/pain medication which added with an anticholinergic medication like Myrbetriq, can result in confusion, disorganized behavior and AMS = (family confirms that Patient has been using pain medications obtained from relatives and did not tolerate it well).    - PT cleared Patient, she has been calm, cooperative - try enhanced supervision. Has not been tried. Will order it now  - pt with improved mental status today, cleared by psych for discharge. Family was informed, intermittent confusion and disorientation can last for weeks  - risk of painkillers and taking meds simultanously d/w niece who translated to pt  - her 4-agent [place] regimen is seemingly excessive for someone in her age group with no significant past psych history apart from a recent admission. Has benefited from simplification overall.

## 2019-06-16 NOTE — PROGRESS NOTE BEHAVIORAL HEALTH - NSBHFUPINTERVALHXFT_PSY_A_CORE
Pt interviewed with  [id number].  She denies acute issues. Patient slept well, mood she says is "not good..good" clarifies as "good" denies / does not exhibit any symptoms of psychosis/MDD/CHRISTIANO/eduardo; denies ay suicidal/homicidal ideation, intent or plan. Same c/o of chronic knee pain. Able to say she is in the hospital however says it's 7/21/18. She says she had to come here because "I was having a shock due to a pill"  Niece by bedside reports pt reports her room was changed yesterday due to the room being "dirty" that she is unsure what to make of. She wants to take her home and fly her to [place] by herself [with transportation, so pt would be wheelchaired to and from [place]] as soon as possible so that she can resume her care with her regular doctors. Family has no acute concerns for pt.  As per RNs one episode of agitation yesterday of hitting herself [pt stated she can't remember doing that], no details known. the night was uneventful. No PRN use. Pt interviewed with  #[id number].  She denies acute issues. Patient slept well, mood she says is "not good..good" clarifies as "good" denies / does not exhibit any symptoms of psychosis/MDD/CHRISTIANO/eduardo; denies any suicidal/homicidal ideation, intent or plan. Same c/o of chronic knee pain. Able to say she is in the hospital however says it's 7/21/18. She says she had to come here because "I was having a shock due to a pill"  Niece by bedside reports pt reports her room was changed yesterday due to the room being "dirty" that she is unsure what to make of. She wants to take her home and fly her to [place] by herself [with transportation, so pt would be wheelchaired to and from [place]] as soon as possible so that she can resume her care with her regular doctors. Family has no acute concerns for pt.  As per RNs one episode of agitation yesterday of hitting herself [pt stated she can't remember doing that], no details known. the night was uneventful. No PRN use.

## 2019-06-16 NOTE — PROGRESS NOTE ADULT - SUBJECTIVE AND OBJECTIVE BOX
Patient is a 74y old  Female who presents with a chief complaint of unresponsiveness.    INTERVAL HPI/OVERNIGHT EVENTS:  Awake and alert but remains confused - not sure where she is    MEDICATIONS  (STANDING):  amLODIPine   Tablet 5 milliGRAM(s) Oral daily  atorvastatin 40 milliGRAM(s) Oral at bedtime  folic acid 1 milliGRAM(s) Oral daily  heparin  Injectable 5000 Unit(s) SubCutaneous every 8 hours    MEDICATIONS  (PRN):  acetaminophen   Tablet .. 650 milliGRAM(s) Oral every 6 hours PRN Mild Pain (1 - 3)  haloperidol    Concentrate 2 milliGRAM(s) Oral every 6 hours PRN agitiation  haloperidol    Injectable 3 milliGRAM(s) IV Push every 6 hours PRN severe agitation  / aggression  mirtazapine 15 milliGRAM(s) Oral at bedtime PRN insomnia  ondansetron Injectable 4 milliGRAM(s) IV Push every 6 hours PRN Nausea and/or Vomiting    Allergies:  No Known Allergies    REVIEW OF SYSTEMS:  All other systems reviewed and are negative    Vital Signs Last 24 Hrs  T(C): 36.7 (15 Chago 2019 05:51), Max: 37.4 (2019 17:06)  T(F): 98.1 (15 Chago 2019 05:51), Max: 99.4 (2019 23:12)  HR: 108 (15 Chago 2019 05:51) (96 - 108)  BP: 168/80 (15 Chago 2019 05:51) (132/60 - 168/80)  BP(mean): --  RR: 18 (15 Chago 2019 05:51) (17 - 19)  SpO2: 98% (15 Chago 2019 05:51) (97% - 99%)  Daily     Daily Weight in k.9 (15 Chago 2019 05:51)  I&O's Summary    2019 07:01  -  15 Chago 2019 07:00  --------------------------------------------------------  IN: 480 mL / OUT: 0 mL / NET: 480 mL    PHYSICAL EXAM:  GENERAL: NAD, well-groomed, well-developed  HEAD:  Atraumatic, Normocephalic  EYES: EOMI, PERRLA, conjunctiva and sclera clear  ENMT: No tonsillar erythema, exudates, or enlargement; Moist mucous membranes, Good dentition, No lesions  NECK: Supple, No JVD, Normal thyroid  NERVOUS SYSTEM:  Alert & Oriented X1, poor concentration; no gross focal deficits  CHEST/LUNG: Clear to percussion bilaterally; No rales, rhonchi, wheezing, or rubs  HEART: Regular rate and rhythm; No murmurs, rubs, or gallops  ABDOMEN: Soft, Nontender, Nondistended; Bowel sounds present  EXTREMITIES:  2+ Peripheral Pulses, No clubbing, cyanosis, or edema  LYMPH: No lymphadenopathy noted  SKIN: No rashes or lesions    Labs                      10.6   5.87  )-----------( 384      (2019 09:16)             33.2     06-14    147<H>  |  113<H>  |  10  ----------------------------<  94  3.5   |  25  |  0.83    Ca    8.7      2019 09:16    TPro  7.3  /  Alb  3.5  /  TBili  0.4  /  DBili  x   /  AST  23  /  ALT  26  /  AlkPhos  64  06-14    PT/INR - ( 2019 21:57 )   PT: 11.2 sec;   INR: 1.00 ratio    PTT - ( 2019 21:57 )  PTT:30.0 sec    CARDIAC MARKERS ( 2019 21:57 )  <.015 ng/mL / x     / x     / x     / x        Urinalysis Basic - ( 2019 01:40 )    Color: Yellow / Appearance: Clear / S.005 / pH: x  Gluc: x / Ketone: Negative  / Bili: Negative / Urobili: Negative mg/dL   Blood: x / Protein: Negative mg/dL / Nitrite: Negative   Leuk Esterase: Trace / RBC: x / WBC x   Sq Epi: x / Non Sq Epi: Few / Bacteria: x    < from: CT Abdomen and Pelvis w/ IV Cont (19 @ 01:10) >  EXAM:  CT ABDOMEN AND PELVIS IC                            PROCEDURE DATE:  2019          INTERPRETATION:  CLINICAL INFORMATION: Abdominal pain    COMPARISON: None    PROCEDURE:   CT of the Abdomen and Pelvis was performed with intravenous contrast.   Intravenous contrast: 80 ml Omnipaque 350. 20 ml discarded.  Oral contrast: None.  Sagittal and coronal reformats were performed.    FINDINGS:    LOWER CHEST: Within normal limits.    LIVER: Within normal limits.  BILE DUCTS: Normal caliber.  GALLBLADDER: Within normal limits.  SPLEEN: Within normal limits.  PANCREAS: Within normal limits.  ADRENALS: Within normal limits.  KIDNEYS/URETERS: 3.5 cm left upper pole renal cyst. Tiny indeterminate   hypodense right renal lesion statistically likely represents a cyst    BLADDER: Within normal limits.  REPRODUCTIVE ORGANS: Hysterectomy. There is fluid in the vagina.     BOWEL: Diverticulosis coli. No bowel obstruction. Appendix normal.  PERITONEUM: No ascites.  VESSELS:  Within normal limits.  RETROPERITONEUM: No lymphadenopathy.    ABDOMINAL WALL: Within normal limits.  BONES: Mild degenerative change of the spine.    IMPRESSION: No acute bowel pathology.    < from: CT Head No Cont (..19 @ 01:09) >  EXAM:  CT BRAIN                            PROCEDURE DATE:  2019          INTERPRETATION:  CLINICAL HISTORY:  Lethargic.    TECHNIQUE:  CT of the head without contrast.  Contiguous transaxial images of the head were acquired from the skull   base to the vertex without the administration of iodinated contrast.   Coronal and sagittal reformatted images are provided.     COMPARISON: None available.    FINDINGS:    There is no acute intracranial hemorrhage, mass effect from vasogenic   edema or evidence for large vascular territory acute infarct. Patchy   areas of low-attenuation are present in the bihemispheric white matter,   nonspecific, but likely sequela of mild chronic microvascular change.    The ventricles, sulci and cisternal spaces are unremarkable in size and   configuration for the patient's stated age.  There is no midline shift or   abnormal extra-axial fluid collection.    There is nonspecific mild left frontal scalp swelling. The calvarium is   intact.  There are no osteoblastic or lytic calvarial or skull base   lesions.  The paranasal sinuses and mastoid air cells are clear.    IMPRESSION:  No acute intracranial hemorrhage or mass effect from vasogenic edema.   Slight asymmetric left frontal scalp swelling; please correlate   clinically for history of recent trauma.      DVT prophylaxis: Heparin
Patient is a 74y old  Female who presents with a chief complaint of unresponsiveness.    INTERVAL HPI/OVERNIGHT EVENTS:  Awake and alert but remains confused - improved since yesterday    MEDICATIONS  (STANDING):  amLODIPine   Tablet 5 milliGRAM(s) Oral daily  atorvastatin 40 milliGRAM(s) Oral at bedtime  dextrose 5%. 1000 milliLiter(s) (50 mL/Hr) IV Continuous <Continuous>  dextrose 50% Injectable 12.5 Gram(s) IV Push once  dextrose 50% Injectable 25 Gram(s) IV Push once  dextrose 50% Injectable 25 Gram(s) IV Push once  folic acid 1 milliGRAM(s) Oral daily  heparin  Injectable 5000 Unit(s) SubCutaneous every 8 hours  insulin lispro (HumaLOG) corrective regimen sliding scale   SubCutaneous three times a day before meals  metoprolol tartrate 25 milliGRAM(s) Oral two times a day    MEDICATIONS  (PRN):  acetaminophen   Tablet .. 650 milliGRAM(s) Oral every 6 hours PRN Mild Pain (1 - 3)  dextrose 40% Gel 15 Gram(s) Oral once PRN Blood Glucose LESS THAN 70 milliGRAM(s)/deciliter  glucagon  Injectable 1 milliGRAM(s) IntraMuscular once PRN Glucose LESS THAN 70 milligrams/deciliter  haloperidol    Concentrate 2 milliGRAM(s) Oral every 6 hours PRN agitiation  haloperidol    Injectable 3 milliGRAM(s) IV Push every 6 hours PRN severe agitation  / aggression  mirtazapine 15 milliGRAM(s) Oral at bedtime PRN insomnia  ondansetron Injectable 4 milliGRAM(s) IV Push every 6 hours PRN Nausea and/or Vomiting    Allergies:  No Known Allergies    REVIEW OF SYSTEMS:  All other systems reviewed and are negative    Vital Signs Last 24 Hrs  T(C): 36.7 (16 Jun 2019 06:21), Max: 37.2 (15 Chago 2019 11:40)  T(F): 98 (16 Jun 2019 06:21), Max: 99 (15 Chago 2019 11:40)  HR: 78 (16 Jun 2019 06:21) (78 - 93)  BP: 146/81 (16 Jun 2019 06:21) (146/81 - 178/71)  BP(mean): --  RR: 17 (16 Jun 2019 06:21) (17 - 18)  SpO2: 95% (16 Jun 2019 06:21) (95% - 100%)    PHYSICAL EXAM:  GENERAL: NAD, well-groomed, well-developed  HEAD:  Atraumatic, Normocephalic  EYES: EOMI, PERRLA, conjunctiva and sclera clear  ENMT: No tonsillar erythema, exudates, or enlargement; Moist mucous membranes, Good dentition, No lesions  NECK: Supple, No JVD, Normal thyroid  NERVOUS SYSTEM:  Alert & Oriented X1, poor concentration; no gross focal deficits  CHEST/LUNG: Clear to percussion bilaterally; No rales, rhonchi, wheezing, or rubs  HEART: Regular rate and rhythm; No murmurs, rubs, or gallops  ABDOMEN: Soft, Nontender, Nondistended; Bowel sounds present  EXTREMITIES:  2+ Peripheral Pulses, No clubbing, cyanosis, or edema  LYMPH: No lymphadenopathy noted  SKIN: No rashes or lesions    Labs                      10.7   4.84  )-----------( 411      ( 16 Jun 2019 06:35 )             33.6     06-16    142  |  108  |  13  ----------------------------<  85  3.5   |  30  |  0.95    Ca    8.6      16 Jun 2019 06:35    TPro  7.3  /  Alb  3.5  /  TBili  0.4  /  DBili  x   /  AST  23  /  ALT  26  /  AlkPhos  64  06-14    Culture - Blood (collected 14 Jun 2019 09:12)  Source: .Blood  Preliminary Report (15 Chago 2019 10:01):    No growth to date.    Culture - Urine (collected 14 Jun 2019 09:08)  Source: .Urine  Final Report (15 Chago 2019 10:56):    >=3 organisms. Probable collection contamination.    < from: CT Abdomen and Pelvis w/ IV Cont (06.14.19 @ 01:10) >  EXAM:  CT ABDOMEN AND PELVIS IC                            PROCEDURE DATE:  06/14/2019          INTERPRETATION:  CLINICAL INFORMATION: Abdominal pain    COMPARISON: None    PROCEDURE:   CT of the Abdomen and Pelvis was performed with intravenous contrast.   Intravenous contrast: 80 ml Omnipaque 350. 20 ml discarded.  Oral contrast: None.  Sagittal and coronal reformats were performed.    FINDINGS:    LOWER CHEST: Within normal limits.    LIVER: Within normal limits.  BILE DUCTS: Normal caliber.  GALLBLADDER: Within normal limits.  SPLEEN: Within normal limits.  PANCREAS: Within normal limits.  ADRENALS: Within normal limits.  KIDNEYS/URETERS: 3.5 cm left upper pole renal cyst. Tiny indeterminate   hypodense right renal lesion statistically likely represents a cyst    BLADDER: Within normal limits.  REPRODUCTIVE ORGANS: Hysterectomy. There is fluid in the vagina.     BOWEL: Diverticulosis coli. No bowel obstruction. Appendix normal.  PERITONEUM: No ascites.  VESSELS:  Within normal limits.  RETROPERITONEUM: No lymphadenopathy.    ABDOMINAL WALL: Within normal limits.  BONES: Mild degenerative change of the spine.    IMPRESSION: No acute bowel pathology.    < from: CT Head No Cont (06.14.19 @ 01:09) >  EXAM:  CT BRAIN                            PROCEDURE DATE:  06/14/2019          INTERPRETATION:  CLINICAL HISTORY:  Lethargic.    TECHNIQUE:  CT of the head without contrast.  Contiguous transaxial images of the head were acquired from the skull   base to the vertex without the administration of iodinated contrast.   Coronal and sagittal reformatted images are provided.     COMPARISON: None available.    FINDINGS:    There is no acute intracranial hemorrhage, mass effect from vasogenic   edema or evidence for large vascular territory acute infarct. Patchy   areas of low-attenuation are present in the bihemispheric white matter,   nonspecific, but likely sequela of mild chronic microvascular change.    The ventricles, sulci and cisternal spaces are unremarkable in size and   configuration for the patient's stated age.  There is no midline shift or   abnormal extra-axial fluid collection.    There is nonspecific mild left frontal scalp swelling. The calvarium is   intact.  There are no osteoblastic or lytic calvarial or skull base   lesions.  The paranasal sinuses and mastoid air cells are clear.    IMPRESSION:  No acute intracranial hemorrhage or mass effect from vasogenic edema.   Slight asymmetric left frontal scalp swelling; please correlate   clinically for history of recent trauma.      DVT prophylaxis: Heparin

## 2019-06-16 NOTE — PROGRESS NOTE BEHAVIORAL HEALTH - OTHER
deferred at this time reactive linear with episodes of confusion and disorientation but has shown improvement does not seem to be responding to internal stimuli MMSE once confusion clears

## 2019-06-16 NOTE — PROGRESS NOTE BEHAVIORAL HEALTH - NSBHCHARTREVIEWVS_PSY_A_CORE FT
Temp (F): 98.1 Degrees F; ; /80; RR 18 /min; SpO2 (%) SpO2 (%): 98 %  O2 delivery Patient On: room air
ICU Vital Signs Last 24 Hrs  T(C): 37.2 (16 Jun 2019 12:23), Max: 37.2 (15 Chago 2019 23:53)  T(F): 99 (16 Jun 2019 12:23), Max: 99 (15 Chago 2019 23:53)  HR: 91 (16 Jun 2019 12:23) (78 - 91)  BP: 128/60 (16 Jun 2019 12:23) (128/60 - 169/91)  BP(mean): --  ABP: --  ABP(mean): --  RR: 16 (16 Jun 2019 12:23) (16 - 17)  SpO2: 94% (16 Jun 2019 12:23) (94% - 98%)

## 2019-06-16 NOTE — PROGRESS NOTE ADULT - ASSESSMENT
Acute delirium, possible medication overdose  History of bipolar disorder  Psychiatry following  continue Haldol and Remeron prn    HTN  continue Amlodipine  add Lopressor 25 mg q12h    DM  on Januvia at home (hold for now)  BGM with SSI coverage  diabetic diet  check HgbA1C    Normocytic anemia  monitor H/H  outpatient eval
Acute delirium, possible medication overdose  History of bipolar disorder  Psychiatry following  continue Haldol and Remeron prn    HTN  continue Amlodipine and Lopressor 25 mg q12h    DM  on Januvia at home (hold for now)  BGM with SSI coverage  diabetic diet  check HgbA1C    Normocytic anemia  H/H stable  outpatient eval

## 2019-06-17 VITALS
TEMPERATURE: 98 F | OXYGEN SATURATION: 98 % | DIASTOLIC BLOOD PRESSURE: 70 MMHG | RESPIRATION RATE: 16 BRPM | HEART RATE: 80 BPM | SYSTOLIC BLOOD PRESSURE: 148 MMHG

## 2019-06-17 PROCEDURE — 99239 HOSP IP/OBS DSCHRG MGMT >30: CPT

## 2019-06-17 RX ORDER — ATORVASTATIN CALCIUM 80 MG/1
1 TABLET, FILM COATED ORAL
Qty: 0 | Refills: 0 | DISCHARGE

## 2019-06-17 RX ORDER — ATORVASTATIN CALCIUM 80 MG/1
1 TABLET, FILM COATED ORAL
Qty: 30 | Refills: 0
Start: 2019-06-17

## 2019-06-17 RX ORDER — METOPROLOL TARTRATE 50 MG
1 TABLET ORAL
Qty: 60 | Refills: 0
Start: 2019-06-17

## 2019-06-17 RX ORDER — FOLIC ACID 0.8 MG
1 TABLET ORAL
Qty: 0 | Refills: 0 | DISCHARGE

## 2019-06-17 RX ORDER — AMLODIPINE BESYLATE 2.5 MG/1
1 TABLET ORAL
Qty: 30 | Refills: 0
Start: 2019-06-17

## 2019-06-17 RX ORDER — FOLIC ACID 0.8 MG
1 TABLET ORAL
Qty: 30 | Refills: 0
Start: 2019-06-17

## 2019-06-17 RX ORDER — SITAGLIPTIN 50 MG/1
1 TABLET, FILM COATED ORAL
Qty: 0 | Refills: 0 | DISCHARGE

## 2019-06-17 RX ORDER — SITAGLIPTIN 50 MG/1
1 TABLET, FILM COATED ORAL
Qty: 30 | Refills: 0
Start: 2019-06-17

## 2019-06-17 RX ADMIN — HEPARIN SODIUM 5000 UNIT(S): 5000 INJECTION INTRAVENOUS; SUBCUTANEOUS at 05:43

## 2019-06-17 RX ADMIN — AMLODIPINE BESYLATE 5 MILLIGRAM(S): 2.5 TABLET ORAL at 05:39

## 2019-06-17 RX ADMIN — HEPARIN SODIUM 5000 UNIT(S): 5000 INJECTION INTRAVENOUS; SUBCUTANEOUS at 14:50

## 2019-06-17 RX ADMIN — Medication 25 MILLIGRAM(S): at 05:39

## 2019-06-17 RX ADMIN — Medication 1 MILLIGRAM(S): at 11:36

## 2019-06-17 NOTE — DISCHARGE NOTE PROVIDER - NSDCCPCAREPLAN_GEN_ALL_CORE_FT
PRINCIPAL DISCHARGE DIAGNOSIS  Diagnosis: Delirium, induced by drug  Assessment and Plan of Treatment:

## 2019-06-17 NOTE — DISCHARGE NOTE PROVIDER - HOSPITAL COURSE
74 year old woman with PMH HTN, HLD, DM2, bipolar presents to ED after being unresponsive at home.  The majority of history was obtained from ED attending and chart as family not available.  Per ED attending, patient is from  and has been living here for months.  Patient was not feeling well 2 days ago and was given a medication by niece (the medication is not known).  After taking the medication the patient became very confused and lethargic and could not be aroused.  With supportive care at home, patient was back at baseline.  It is unclear how patient then got more of the medication (family thinks she took it on her own) but she was acting exactly the same last night so she was brought to our ED.  No one witnessed her taking more of the medication. Seen by Psychiatry - mental state improved and was cleared for discharge.        Dx:    Acute delirium, possible medication overdose    History of bipolar disorder    DM    Normocytic anemia        Vital Signs Last 24 Hrs    T(C): 36.7 (17 Jun 2019 04:56), Max: 37.7 (16 Jun 2019 17:47)    T(F): 98 (17 Jun 2019 04:56), Max: 99.8 (16 Jun 2019 17:47)    HR: 78 (17 Jun 2019 04:56) (78 - 94)    BP: 129/58 (17 Jun 2019 04:56) (128/60 - 156/67)    BP(mean): --    RR: 17 (17 Jun 2019 04:56) (16 - 20)    SpO2: 96% (17 Jun 2019 04:56) (94% - 97%)        PHYSICAL EXAM:    GENERAL: NAD, well-groomed, well-developed    HEAD:  Atraumatic, Normocephalic    EYES: EOMI, PERRLA, conjunctiva and sclera clear    ENMT: No tonsillar erythema, exudates, or enlargement; Moist mucous membranes, Good dentition, No lesions    NECK: Supple, No JVD, Normal thyroid    NERVOUS SYSTEM:  Alert & Oriented X1, poor concentration; no gross focal deficits    CHEST/LUNG: Clear to percussion bilaterally; No rales, rhonchi, wheezing, or rubs    HEART: Regular rate and rhythm; No murmurs, rubs, or gallops    ABDOMEN: Soft, Nontender, Nondistended; Bowel sounds present    EXTREMITIES:  2+ Peripheral Pulses, No clubbing, cyanosis, or edema    LYMPH: No lymphadenopathy noted    SKIN: No rashes or lesions        Labs                        10.7     4.84  )-----------( 411      ( 16 Jun 2019 06:35 )               33.6         06-16        142  |  108  |  13    ----------------------------<  85    3.5   |  30  |  0.95        Ca    8.6      16 Jun 2019 06:35        Culture - Blood (collected 14 Jun 2019 09:12)    Source: .Blood    Preliminary Report (15 Chago 2019 10:01):      No growth to date.        Culture - Urine (collected 14 Jun 2019 09:08)    Source: .Urine    Final Report (15 Chago 2019 10:56):      >=3 organisms. Probable collection contamination.        < from: CT Head No Cont (06.14.19 @ 01:09) >        IMPRESSION:    No acute intracranial hemorrhage or mass effect from vasogenic edema.     Slight asymmetric left frontal scalp swelling; please correlate     clinically for history of recent trauma.        < from: CT Abdomen and Pelvis w/ IV Cont (06.14.19 @ 01:10) >        IMPRESSION: No acute bowel pathology.

## 2019-06-17 NOTE — DISCHARGE NOTE NURSING/CASE MANAGEMENT/SOCIAL WORK - NSDCDPATPORTLINK_GEN_ALL_CORE
You can access the [facility] Patient Portal, offered by [facility], by registering with the following website: [email or web address]

## 2019-06-19 LAB
CULTURE RESULTS: SIGNIFICANT CHANGE UP
SPECIMEN SOURCE: SIGNIFICANT CHANGE UP

## 2019-06-25 DIAGNOSIS — F19.921 OTHER PSYCHOACTIVE SUBSTANCE USE, UNSPECIFIED WITH INTOXICATION WITH DELIRIUM: ICD-10-CM

## 2019-06-25 DIAGNOSIS — E11.9 TYPE 2 DIABETES MELLITUS WITHOUT COMPLICATIONS: ICD-10-CM

## 2019-06-25 DIAGNOSIS — T50.901A POISONING BY UNSPECIFIED DRUGS, MEDICAMENTS AND BIOLOGICAL SUBSTANCES, ACCIDENTAL (UNINTENTIONAL), INITIAL ENCOUNTER: ICD-10-CM

## 2019-06-25 DIAGNOSIS — N32.81 OVERACTIVE BLADDER: ICD-10-CM

## 2019-06-25 DIAGNOSIS — D64.9 ANEMIA, UNSPECIFIED: ICD-10-CM

## 2019-06-25 DIAGNOSIS — E78.5 HYPERLIPIDEMIA, UNSPECIFIED: ICD-10-CM

## 2019-06-25 DIAGNOSIS — F31.9 BIPOLAR DISORDER, UNSPECIFIED: ICD-10-CM

## 2019-06-25 DIAGNOSIS — G47.00 INSOMNIA, UNSPECIFIED: ICD-10-CM

## 2019-06-25 DIAGNOSIS — Z79.84 LONG TERM (CURRENT) USE OF ORAL HYPOGLYCEMIC DRUGS: ICD-10-CM

## 2019-06-25 DIAGNOSIS — I10 ESSENTIAL (PRIMARY) HYPERTENSION: ICD-10-CM

## 2021-09-22 NOTE — PROGRESS NOTE BEHAVIORAL HEALTH - NSBHFUPINTERVALHXFT_PSY_A_CORE
Back Pain No significant interval events; no PRNs needed. No significant interval events; no PRNs needed. Patient better since yesterday - more able to maintain attention, less delay in recall/trying to think of words, more conversational in English. + residual confusion as expected which should gradually regress. Patient slept well, denies / does not exhibit any symptoms of psychosis/MDD/CHRISTIANO/eduardo; denies ay suicidal/homicidal ideation, intent or plan. As per staff at bedside, she has been calm this morning, walked about being from [place] and staying here in [place] with here niece. Same c/o of chronic knee pain. No agitation. Did not eat breakfast but drank juice.

## 2023-12-28 NOTE — ED ADULT NURSE NOTE - RESPIRATORY ASSESSMENT
Quality 431: Preventive Care And Screening: Unhealthy Alcohol Use - Screening: Patient not identified as an unhealthy alcohol user when screened for unhealthy alcohol use using a systematic screening method
Detail Level: Detailed
Quality 402: Tobacco Use And Help With Quitting Among Adolescents: Patient screened for tobacco and never smoked
Quality 226: Preventive Care And Screening: Tobacco Use: Screening And Cessation Intervention: Patient screened for tobacco use and is an ex/non-smoker
WDL

## 2024-06-14 NOTE — H&P ADULT - PROBLEM/PLAN-3
Cassy pt mother to offer ST eval. 6/25@8am. Informed wait list for treatment stated understanding   DISPLAY PLAN FREE TEXT